# Patient Record
Sex: FEMALE | Race: WHITE | NOT HISPANIC OR LATINO | Employment: OTHER | URBAN - METROPOLITAN AREA
[De-identification: names, ages, dates, MRNs, and addresses within clinical notes are randomized per-mention and may not be internally consistent; named-entity substitution may affect disease eponyms.]

---

## 2023-07-11 ENCOUNTER — NURSING HOME VISIT (OUTPATIENT)
Dept: POST ACUTE CARE | Facility: EXTERNAL LOCATION | Age: 88
End: 2023-07-11
Payer: MEDICARE

## 2023-07-11 VITALS
WEIGHT: 127 LBS | HEIGHT: 68 IN | HEART RATE: 87 BPM | SYSTOLIC BLOOD PRESSURE: 118 MMHG | DIASTOLIC BLOOD PRESSURE: 70 MMHG | BODY MASS INDEX: 19.25 KG/M2

## 2023-07-11 DIAGNOSIS — L30.8 HERPES ZOSTER DERMATITIS: ICD-10-CM

## 2023-07-11 DIAGNOSIS — E03.9 ACQUIRED HYPOTHYROIDISM: ICD-10-CM

## 2023-07-11 DIAGNOSIS — I10 PRIMARY HYPERTENSION: ICD-10-CM

## 2023-07-11 DIAGNOSIS — R42 VERTIGO: ICD-10-CM

## 2023-07-11 DIAGNOSIS — I63.9: ICD-10-CM

## 2023-07-11 DIAGNOSIS — R41.89 COGNITIVE DECLINE: Primary | ICD-10-CM

## 2023-07-11 DIAGNOSIS — B02.8 HERPES ZOSTER DERMATITIS: ICD-10-CM

## 2023-07-11 PROBLEM — H40.1122 PRIMARY OPEN-ANGLE GLAUCOMA, LEFT EYE, MODERATE STAGE: Status: ACTIVE | Noted: 2023-07-11

## 2023-07-11 PROBLEM — R73.03 PRE-DIABETES: Status: ACTIVE | Noted: 2023-07-11

## 2023-07-11 PROBLEM — E78.5 HLD (HYPERLIPIDEMIA): Status: ACTIVE | Noted: 2023-07-11

## 2023-07-11 PROBLEM — I35.0 SEVERE AORTIC STENOSIS: Status: ACTIVE | Noted: 2023-07-11

## 2023-07-11 PROBLEM — I63.532: Status: RESOLVED | Noted: 2023-07-11 | Resolved: 2023-07-11

## 2023-07-11 PROBLEM — I63.532: Status: ACTIVE | Noted: 2023-07-11

## 2023-07-11 PROBLEM — H25.11 AGE-RELATED NUCLEAR CATARACT OF RIGHT EYE: Status: ACTIVE | Noted: 2023-07-11

## 2023-07-11 PROBLEM — H25.12 AGE-RELATED NUCLEAR CATARACT OF LEFT EYE: Status: ACTIVE | Noted: 2023-07-11

## 2023-07-11 PROCEDURE — 99348 HOME/RES VST EST LOW MDM 30: CPT | Performed by: NURSE PRACTITIONER

## 2023-07-11 NOTE — PROGRESS NOTES
"Subjective   Radha Lorenzo is a 88 y.o. female who is assisted living/ home patient being seen and evaluated for multiple medical problems.    88y old female admitted to [facility] today following a stay in SNF after being hospitalized for shingles that was extensive, now healing well, no open areas. She has PMH of Hypothyroid,AV disorder, HTN,glaucoma and frequent UTI.    50% of time was spent on preparing to see the patient, performing exam or evaluation, coordination of care, ordering medications,tests and labs, referring and communicating with other health care providers , interpreting results, obtaining and reviewing history, tests, medications and documenting clinical information  EMR. Time spent >35 minutes             Objective   /70   Pulse 87   Ht 1.727 m (5' 8\")   Wt 57.6 kg (127 lb)   BMI 19.31 kg/m²     Physical Exam  Vitals and nursing note reviewed.   Constitutional:       General: She is not in acute distress.  HENT:      Head: Normocephalic and atraumatic.   Cardiovascular:      Rate and Rhythm: Normal rate and regular rhythm.      Heart sounds: Murmur heard.   Pulmonary:      Effort: Pulmonary effort is normal.      Breath sounds: Normal breath sounds.   Abdominal:      General: Bowel sounds are normal.      Palpations: Abdomen is soft.   Musculoskeletal:      Right lower leg: No edema.      Left lower leg: No edema.   Skin:     General: Skin is warm and dry.   Neurological:      Mental Status: She is alert and oriented to person, place, and time.   Psychiatric:         Mood and Affect: Mood normal.         Assessment/Plan   Problem List Items Addressed This Visit       Cognitive decline - Primary    Primary hypertension     Patient stable, will continue same treatment and monitor. Nursing to inform CNP/MD of any changes in condition or new problems         Acquired hypothyroidism    Vertigo    Herpes zoster dermatitis     Healing          Paralytic stroke (CMS/[facility])     2/2023          " Check blood work TSH,A1C,BMP,CBC,B12, Vitamin D and Mag in a few weeks.

## 2023-07-11 NOTE — LETTER
"Patient: Radha Lorenzo  : 1934    Encounter Date: 2023    Subjective  Radha Lorenzo is a 88 y.o. female who is assisted living/ home patient being seen and evaluated for multiple medical problems.    88y old female admitted to [facility] today following a stay in SNF after being hospitalized for shingles that was extensive, now healing well, no open areas. She has PMH of Hypothyroid,AV disorder, HTN,glaucoma and frequent UTI.    50% of time was spent on preparing to see the patient, performing exam or evaluation, coordination of care, ordering medications,tests and labs, referring and communicating with other health care providers , interpreting results, obtaining and reviewing history, tests, medications and documenting clinical information  EMR. Time spent >35 minutes             Objective  /70   Pulse 87   Ht 1.727 m (5' 8\")   Wt 57.6 kg (127 lb)   BMI 19.31 kg/m²     Physical Exam  Vitals and nursing note reviewed.   Constitutional:       General: She is not in acute distress.  HENT:      Head: Normocephalic and atraumatic.   Cardiovascular:      Rate and Rhythm: Normal rate and regular rhythm.      Heart sounds: Murmur heard.   Pulmonary:      Effort: Pulmonary effort is normal.      Breath sounds: Normal breath sounds.   Abdominal:      General: Bowel sounds are normal.      Palpations: Abdomen is soft.   Musculoskeletal:      Right lower leg: No edema.      Left lower leg: No edema.   Skin:     General: Skin is warm and dry.   Neurological:      Mental Status: She is alert and oriented to person, place, and time.   Psychiatric:         Mood and Affect: Mood normal.         Assessment/Plan  Problem List Items Addressed This Visit       Cognitive decline - Primary    Primary hypertension     Patient stable, will continue same treatment and monitor. Nursing to inform CNP/MD of any changes in condition or new problems         Acquired hypothyroidism    Vertigo    Herpes zoster " dermatitis     Healing          Paralytic stroke (CMS/[facility])     2/2023          Check blood work TSH,A1C,BMP,CBC,B12, Vitamin D and Mag in a few weeks.      Electronically Signed By: FELIPA Garduno   7/11/23  5:00 PM

## 2023-07-24 ENCOUNTER — NURSING HOME VISIT (OUTPATIENT)
Dept: POST ACUTE CARE | Facility: EXTERNAL LOCATION | Age: 88
End: 2023-07-24
Payer: MEDICARE

## 2023-07-24 VITALS
BODY MASS INDEX: 16.83 KG/M2 | TEMPERATURE: 97.7 F | SYSTOLIC BLOOD PRESSURE: 96 MMHG | DIASTOLIC BLOOD PRESSURE: 69 MMHG | HEART RATE: 77 BPM | WEIGHT: 110.7 LBS

## 2023-07-24 DIAGNOSIS — E03.9 ACQUIRED HYPOTHYROIDISM: ICD-10-CM

## 2023-07-24 DIAGNOSIS — M25.552 PAIN OF LEFT HIP: ICD-10-CM

## 2023-07-24 DIAGNOSIS — M54.50 ACUTE LEFT-SIDED LOW BACK PAIN WITHOUT SCIATICA: ICD-10-CM
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
## 2023-07-24 DIAGNOSIS — I95.1 ORTHOSTATIC HYPOTENSION: ICD-10-CM

## 2023-07-24 DIAGNOSIS — I10 PRIMARY HYPERTENSION: Primary | ICD-10-CM

## 2023-07-24 PROCEDURE — 99349 HOME/RES VST EST MOD MDM 40: CPT | Performed by: NURSE PRACTITIONER

## 2023-07-24 NOTE — PROGRESS NOTES
Subjective   Radha Lorenzo is a 88 y.o. female who is assisted living/ home patient being seen and evaluated for multiple medical problems.    Resident has had two falls the past few days with complaints of dizziness when standing, she states she has learned to get up slowly. She had no apparent injuries, she is ambulating with her walker but states her LT posterior hip area hurts when she stands. Medications reviewed, she is on amlodipine 5 mg daily and BP between 96//70.   Recent blood work is good.     50% of time was spent on preparing to see the patient, performing exam or evaluation, coordination of care, ordering medications,tests and labs, referring and communicating with other health care providers , interpreting results, obtaining and reviewing history, tests, medications and documenting clinical information  EMR. Time spent >35 minutes             Objective   BP 96/69   Pulse 77   Temp 36.5 °C (97.7 °F)   Wt 50.2 kg (110 lb 11.2 oz)   BMI 16.83 kg/m²     Physical Exam  Vitals and nursing note reviewed.   Constitutional:       General: She is not in acute distress.  HENT:      Head: Normocephalic and atraumatic.   Cardiovascular:      Rate and Rhythm: Normal rate and regular rhythm.      Heart sounds: Murmur heard.   Pulmonary:      Effort: Pulmonary effort is normal.      Breath sounds: Normal breath sounds.   Musculoskeletal:      Right lower leg: No edema.      Left lower leg: No edema.   Skin:     General: Skin is warm and dry.   Neurological:      Mental Status: She is alert and oriented to person, place, and time.      Comments: forgetful   Psychiatric:         Mood and Affect: Mood normal.         Assessment/Plan   Problem List Items Addressed This Visit       Primary hypertension - Primary     Add hold parameter to amlodipine 5 mg daily for SBP<110         Acquired hypothyroidism     TSH level WNL          Other Visit Diagnoses       Orthostatic hypotension        check orthostatic BP  and HR daily x4    Pain of left hip        check HIP Xray    Acute left-sided low back pain without sciatica        check lumbar spine x ray    Fall, initial encounter        PT/OT

## 2023-07-24 NOTE — LETTER
Patient: Radha Lorenzo  : 1934    Encounter Date: 2023    Subjective  Radha Lorenzo is a 88 y.o. female who is assisted living/ home patient being seen and evaluated for multiple medical problems.    Resident has had two falls the past few days with complaints of dizziness when standing, she states she has learned to get up slowly. She had no apparent injuries, she is ambulating with her walker but states her LT posterior hip area hurts when she stands. Medications reviewed, she is on amlodipine 5 mg daily and BP between 96//70.   Recent blood work is good.     50% of time was spent on preparing to see the patient, performing exam or evaluation, coordination of care, ordering medications,tests and labs, referring and communicating with other health care providers , interpreting results, obtaining and reviewing history, tests, medications and documenting clinical information  EMR. Time spent >35 minutes             Objective  BP 96/69   Pulse 77   Temp 36.5 °C (97.7 °F)   Wt 50.2 kg (110 lb 11.2 oz)   BMI 16.83 kg/m²     Physical Exam  Vitals and nursing note reviewed.   Constitutional:       General: She is not in acute distress.  HENT:      Head: Normocephalic and atraumatic.   Cardiovascular:      Rate and Rhythm: Normal rate and regular rhythm.      Heart sounds: Murmur heard.   Pulmonary:      Effort: Pulmonary effort is normal.      Breath sounds: Normal breath sounds.   Musculoskeletal:      Right lower leg: No edema.      Left lower leg: No edema.   Skin:     General: Skin is warm and dry.   Neurological:      Mental Status: She is alert and oriented to person, place, and time.      Comments: forgetful   Psychiatric:         Mood and Affect: Mood normal.         Assessment/Plan  Problem List Items Addressed This Visit       Primary hypertension - Primary     Add hold parameter to amlodipine 5 mg daily for SBP<110         Acquired hypothyroidism     TSH level WNL          Other Visit  Diagnoses       Orthostatic hypotension        check orthostatic BP and HR daily x4    Pain of left hip        check HIP Xray    Acute left-sided low back pain without sciatica        check lumbar spine x ray    Fall, initial encounter        PT/OT              Electronically Signed By: FELIPA Garduno   7/24/23  2:01 PM

## 2023-10-17 ENCOUNTER — NURSING HOME VISIT (OUTPATIENT)
Dept: POST ACUTE CARE | Facility: EXTERNAL LOCATION | Age: 88
End: 2023-10-17
Payer: MEDICARE

## 2023-10-17 VITALS
WEIGHT: 136 LBS | BODY MASS INDEX: 20.68 KG/M2 | TEMPERATURE: 98.2 F | DIASTOLIC BLOOD PRESSURE: 73 MMHG | SYSTOLIC BLOOD PRESSURE: 127 MMHG | HEART RATE: 74 BPM

## 2023-10-17 DIAGNOSIS — R42 VERTIGO: Primary | ICD-10-CM

## 2023-10-17 DIAGNOSIS — I10 PRIMARY HYPERTENSION: ICD-10-CM

## 2023-10-17 DIAGNOSIS — E03.9 ACQUIRED HYPOTHYROIDISM: ICD-10-CM
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
## 2023-10-17 PROCEDURE — 99349 HOME/RES VST EST MOD MDM 40: CPT | Performed by: NURSE PRACTITIONER

## 2023-10-17 RX ORDER — AMLODIPINE BESYLATE 5 MG/1
5 TABLET ORAL DAILY
COMMUNITY
Start: 2022-03-29

## 2023-10-17 ASSESSMENT — ENCOUNTER SYMPTOMS
DIARRHEA: 0
VOMITING: 0
AGITATION: 0
FATIGUE: 0
SHORTNESS OF BREATH: 0
SLEEP DISTURBANCE: 0
WEAKNESS: 0
NAUSEA: 0
FEVER: 0
CONSTIPATION: 0
NERVOUS/ANXIOUS: 0
COUGH: 0

## 2023-10-17 NOTE — PROGRESS NOTES
Subjective   Radha Lorenzo is a 88 y.o. female who is assisted living/ home patient being seen and evaluated for multiple medical problems.    Seen today S/P fall, she states she lost her balance in the bathroom, she hit the RT side of her face, now with large bruise and cut above her eye. Denies any LOC, not on any blood thinners.     50% of time was spent on preparing to see the patient, performing exam or evaluation, coordination of care, ordering medications,tests and labs, referring and communicating with other health care providers , interpreting results, obtaining and reviewing history, tests, medications and documenting clinical information  EMR. Time spent >35 minutes         Review of Systems   Constitutional:  Negative for fatigue and fever.   Respiratory:  Negative for cough and shortness of breath.    Cardiovascular:  Negative for chest pain and leg swelling.   Gastrointestinal:  Negative for constipation, diarrhea, nausea and vomiting.   Skin:  Negative for pallor and rash.        Bruise to RT side of face and laceration above RT eye   Neurological:  Negative for weakness.   Psychiatric/Behavioral:  Negative for agitation, behavioral problems and sleep disturbance. The patient is not nervous/anxious.        Objective   /73   Pulse 74   Temp 36.8 °C (98.2 °F)   Wt 61.7 kg (136 lb)   BMI 20.68 kg/m²     Physical Exam  Vitals and nursing note reviewed.   Constitutional:       General: She is not in acute distress.  HENT:      Head: Normocephalic and atraumatic.   Cardiovascular:      Rate and Rhythm: Normal rate and regular rhythm.      Heart sounds: Murmur heard.   Pulmonary:      Effort: Pulmonary effort is normal.      Breath sounds: Normal breath sounds.   Musculoskeletal:      Right lower leg: No edema.      Left lower leg: No edema.   Skin:     General: Skin is warm and dry.   Neurological:      Mental Status: She is alert and oriented to person, place, and time.      Comments: forgetful    Psychiatric:         Mood and Affect: Mood normal.         Assessment/Plan   Problem List Items Addressed This Visit       Primary hypertension    Acquired hypothyroidism    Vertigo - Primary     Other Visit Diagnoses       Fall, initial encounter        Check ortho static BP BID x 3 days  CBC,BMP,TSH on 10/20

## 2023-10-17 NOTE — LETTER
Patient: Radha Lorenzo  : 1934    Encounter Date: 10/17/2023    Subjective  Radha Lorenzo is a 88 y.o. female who is assisted living/ home patient being seen and evaluated for multiple medical problems.    Seen today S/P fall, she states she lost her balance in the bathroom, she hit the RT side of her face, now with large bruise and cut above her eye. Denies any LOC, not on any blood thinners.     50% of time was spent on preparing to see the patient, performing exam or evaluation, coordination of care, ordering medications,tests and labs, referring and communicating with other health care providers , interpreting results, obtaining and reviewing history, tests, medications and documenting clinical information  EMR. Time spent >35 minutes         Review of Systems   Constitutional:  Negative for fatigue and fever.   Respiratory:  Negative for cough and shortness of breath.    Cardiovascular:  Negative for chest pain and leg swelling.   Gastrointestinal:  Negative for constipation, diarrhea, nausea and vomiting.   Skin:  Negative for pallor and rash.        Bruise to RT side of face and laceration above RT eye   Neurological:  Negative for weakness.   Psychiatric/Behavioral:  Negative for agitation, behavioral problems and sleep disturbance. The patient is not nervous/anxious.        Objective  /73   Pulse 74   Temp 36.8 °C (98.2 °F)   Wt 61.7 kg (136 lb)   BMI 20.68 kg/m²     Physical Exam  Vitals and nursing note reviewed.   Constitutional:       General: She is not in acute distress.  HENT:      Head: Normocephalic and atraumatic.   Cardiovascular:      Rate and Rhythm: Normal rate and regular rhythm.      Heart sounds: Murmur heard.   Pulmonary:      Effort: Pulmonary effort is normal.      Breath sounds: Normal breath sounds.   Musculoskeletal:      Right lower leg: No edema.      Left lower leg: No edema.   Skin:     General: Skin is warm and dry.   Neurological:      Mental Status: She is  alert and oriented to person, place, and time.      Comments: forgetful   Psychiatric:         Mood and Affect: Mood normal.         Assessment/Plan  Problem List Items Addressed This Visit       Primary hypertension    Acquired hypothyroidism    Vertigo - Primary     Other Visit Diagnoses       Fall, initial encounter        Check ortho static BP BID x 3 days  CBC,BMP,TSH on 10/20              Electronically Signed By: FELIPA Garduno   10/17/23  2:38 PM

## 2023-10-26 ENCOUNTER — NURSING HOME VISIT (OUTPATIENT)
Dept: POST ACUTE CARE | Facility: EXTERNAL LOCATION | Age: 88
End: 2023-10-26
Payer: MEDICARE

## 2023-10-26 DIAGNOSIS — H61.23 EXCESSIVE CERUMEN IN BOTH EAR CANALS: Primary | ICD-10-CM

## 2023-10-26 DIAGNOSIS — I10 PRIMARY HYPERTENSION: ICD-10-CM

## 2023-10-26 PROCEDURE — 99348 HOME/RES VST EST LOW MDM 30: CPT | Performed by: NURSE PRACTITIONER

## 2023-10-26 NOTE — LETTER
Patient: Radha Lorenzo  : 1934    Encounter Date: 10/26/2023    Subjective  Radha Lorenzo is a 88 y.o. female who is assisted living/ home patient being seen and evaluated for multiple medical problems.    Her orthostatic BP and HR were okay, she today is having complaints of increased wax in her ears         Review of Systems   HENT:  Negative for congestion, ear discharge and ear pain.         Increased wax and decreased hearing       Objective  There were no vitals taken for this visit.    Physical Exam  Vitals and nursing note reviewed.   Constitutional:       General: She is not in acute distress.  HENT:      Head: Normocephalic and atraumatic.      Right Ear: There is impacted cerumen.      Left Ear: There is impacted cerumen.   Cardiovascular:      Rate and Rhythm: Normal rate and regular rhythm.      Heart sounds: Murmur heard.   Pulmonary:      Effort: Pulmonary effort is normal.      Breath sounds: Normal breath sounds.   Musculoskeletal:      Right lower leg: No edema.      Left lower leg: No edema.   Skin:     General: Skin is warm and dry.   Neurological:      Mental Status: She is alert and oriented to person, place, and time.   Psychiatric:         Mood and Affect: Mood normal.         Assessment/Plan  Problem List Items Addressed This Visit       Primary hypertension     Controlled  Continue same treatment  Amlodipine 5mg daily         Excessive cerumen in both ear canals - Primary     Debrox gtts to bilateral ears BID x 4 days then flush  Will reevaluate next week 10/31              Electronically Signed By: FERN Garduno-CNP   10/26/23 12:58 PM

## 2023-10-26 NOTE — PROGRESS NOTES
Subjective   Radha Lorenzo is a 88 y.o. female who is assisted living/ home patient being seen and evaluated for multiple medical problems.    Her orthostatic BP and HR were okay, she today is having complaints of increased wax in her ears         Review of Systems   HENT:  Negative for congestion, ear discharge and ear pain.         Increased wax and decreased hearing       Objective   There were no vitals taken for this visit.    Physical Exam  Vitals and nursing note reviewed.   Constitutional:       General: She is not in acute distress.  HENT:      Head: Normocephalic and atraumatic.      Right Ear: There is impacted cerumen.      Left Ear: There is impacted cerumen.   Cardiovascular:      Rate and Rhythm: Normal rate and regular rhythm.      Heart sounds: Murmur heard.   Pulmonary:      Effort: Pulmonary effort is normal.      Breath sounds: Normal breath sounds.   Musculoskeletal:      Right lower leg: No edema.      Left lower leg: No edema.   Skin:     General: Skin is warm and dry.   Neurological:      Mental Status: She is alert and oriented to person, place, and time.   Psychiatric:         Mood and Affect: Mood normal.         Assessment/Plan   Problem List Items Addressed This Visit       Primary hypertension     Controlled  Continue same treatment  Amlodipine 5mg daily         Excessive cerumen in both ear canals - Primary     Debrox gtts to bilateral ears BID x 4 days then flush  Will reevaluate next week 10/31

## 2023-11-07 ENCOUNTER — NURSING HOME VISIT (OUTPATIENT)
Dept: POST ACUTE CARE | Facility: EXTERNAL LOCATION | Age: 88
End: 2023-11-07
Payer: MEDICARE

## 2023-11-07 DIAGNOSIS — H61.23 EXCESSIVE CERUMEN IN BOTH EAR CANALS: Primary | ICD-10-CM

## 2023-11-07 PROCEDURE — 99348 HOME/RES VST EST LOW MDM 30: CPT | Performed by: NURSE PRACTITIONER

## 2023-11-07 ASSESSMENT — ENCOUNTER SYMPTOMS
HEADACHES: 0
DIZZINESS: 0

## 2023-11-07 NOTE — PROGRESS NOTES
Subjective   Radha Lorenzo is a 88 y.o. female who is assisted living/ home patient being seen and evaluated for multiple medical problems.    Seen today for follow up on excess cerumen. Upon exam her ears are clearer, still some cerumen left but too deep to remove at this time. She has an appointment with otology for removal. In the meantime will continue debrox gtts x 4 more days. She denies pain or discomfort         Review of Systems   HENT:  Positive for hearing loss. Negative for ear discharge and ear pain.    Neurological:  Negative for dizziness and headaches.       Objective       Physical Exam  Vitals and nursing note reviewed.   Constitutional:       General: She is not in acute distress.  HENT:      Head: Normocephalic and atraumatic.      Right Ear: Ear canal and external ear normal.      Left Ear: Ear canal and external ear normal.      Ears:      Comments: [place]    Cardiovascular:      Rate and Rhythm: Normal rate and regular rhythm.      Heart sounds: Murmur heard.   Pulmonary:      Effort: Pulmonary effort is normal.      Breath sounds: Normal breath sounds.   Musculoskeletal:      Right lower leg: No edema.      Left lower leg: No edema.   Skin:     General: Skin is warm and dry.   Neurological:      Mental Status: She is alert and oriented to person, place, and time.   Psychiatric:         Mood and Affect: Mood normal.         Assessment/Plan   Problem List Items Addressed This Visit       Excessive cerumen in both ear canals - Primary     Continue debrox gtts to each ear BID x 4 more days

## 2023-11-07 NOTE — LETTER
Patient: Radha Lorenzo  : 1934    Encounter Date: 2023    Subjective  Radha Lorenzo is a 88 y.o. female who is assisted living/ home patient being seen and evaluated for multiple medical problems.    Seen today for follow up on excess cerumen. Upon exam her ears are clearer, still some cerumen left but too deep to remove at this time. She has an appointment with otology for removal. In the meantime will continue debrox gtts x 4 more days. She denies pain or discomfort         Review of Systems   HENT:  Positive for hearing loss. Negative for ear discharge and ear pain.    Neurological:  Negative for dizziness and headaches.       Objective      Physical Exam  Vitals and nursing note reviewed.   Constitutional:       General: She is not in acute distress.  HENT:      Head: Normocephalic and atraumatic.      Right Ear: Ear canal and external ear normal.      Left Ear: Ear canal and external ear normal.      Ears:      Comments: [place]    Cardiovascular:      Rate and Rhythm: Normal rate and regular rhythm.      Heart sounds: Murmur heard.   Pulmonary:      Effort: Pulmonary effort is normal.      Breath sounds: Normal breath sounds.   Musculoskeletal:      Right lower leg: No edema.      Left lower leg: No edema.   Skin:     General: Skin is warm and dry.   Neurological:      Mental Status: She is alert and oriented to person, place, and time.   Psychiatric:         Mood and Affect: Mood normal.         Assessment/Plan  Problem List Items Addressed This Visit       Excessive cerumen in both ear canals - Primary     Continue debrox gtts to each ear BID x 4 more days              Electronically Signed By: FERN Garduno-CNP   23  1:24 PM

## 2023-12-04 ENCOUNTER — NURSING HOME VISIT (OUTPATIENT)
Dept: POST ACUTE CARE | Facility: EXTERNAL LOCATION | Age: 88
End: 2023-12-04
Payer: MEDICARE

## 2023-12-04 DIAGNOSIS — I95.1 ORTHOSTATIC HYPOTENSION: ICD-10-CM

## 2023-12-04 DIAGNOSIS — I35.0 SEVERE AORTIC STENOSIS: Primary | ICD-10-CM

## 2023-12-04 PROCEDURE — 99349 HOME/RES VST EST MOD MDM 40: CPT | Performed by: INTERNAL MEDICINE

## 2023-12-04 NOTE — PROGRESS NOTES
Follow-up visit  Patient request to be seen because of complaints of postural dizziness.  She reports that she gets lightheaded or dizzy upon standing up from a supine position or sitting position.  Postural blood pressures were obtained and demonstrated sitting blood pressure 138/61 with a heart rate of 75 and upon standing her blood pressure dropped to 108/52 with heart rate of 93.    Medications and orders reviewed.    Review of systems  No other significant plaints were reported    Physical exam  Orthostatic blood pressures as recorded above.  Pulse ox is 97% on room air, temp is 97.6  She is alert oriented x3 no apparent distress.  Lungs are clear.  Heart rate and rhythm is regular with a 4/6 systolic ejection murmur consistent with her history of aortic stenosis.    Assessment and care plan  Evidence of symptomatic orthostatic hypotension along with history of significant aortic stenosis.  Patient is currently on amlodipine 5 mg twice a day but given the above clinical situation we will decrease the amlodipine to 5 mg once a day but add metoprolol succinate 25 mg once daily to help optimize heart rate given the aortic stenosis.  We will monitor the situation and adjust the above meds accordingly.  If necessary we can add low-dose midodrine if the symptoms persist

## 2023-12-04 NOTE — LETTER
Patient: Radha Lorenzo  : 1934    Encounter Date: 2023    Follow-up visit  Patient request to be seen because of complaints of postural dizziness.  She reports that she gets lightheaded or dizzy upon standing up from a supine position or sitting position.  Postural blood pressures were obtained and demonstrated sitting blood pressure 138/61 with a heart rate of 75 and upon standing her blood pressure dropped to 108/52 with heart rate of 93.    Medications and orders reviewed.    Review of systems  No other significant plaints were reported    Physical exam  Orthostatic blood pressures as recorded above.  Pulse ox is 97% on room air, temp is 97.6  She is alert oriented x3 no apparent distress.  Lungs are clear.  Heart rate and rhythm is regular with a 4/6 systolic ejection murmur consistent with her history of aortic stenosis.    Assessment and care plan  Evidence of symptomatic orthostatic hypotension along with history of significant aortic stenosis.  Patient is currently on amlodipine 5 mg twice a day but given the above clinical situation we will decrease the amlodipine to 5 mg once a day but add metoprolol succinate 25 mg once daily to help optimize heart rate given the aortic stenosis.  We will monitor the situation and adjust the above meds accordingly.  If necessary we can add low-dose midodrine if the symptoms persist      Electronically Signed By: Tej Olvera DO   23  1:20 PM

## 2023-12-07 ENCOUNTER — NURSING HOME VISIT (OUTPATIENT)
Dept: POST ACUTE CARE | Facility: EXTERNAL LOCATION | Age: 88
End: 2023-12-07
Payer: MEDICARE

## 2023-12-07 VITALS
DIASTOLIC BLOOD PRESSURE: 61 MMHG | WEIGHT: 134 LBS | BODY MASS INDEX: 20.31 KG/M2 | HEART RATE: 76 BPM | SYSTOLIC BLOOD PRESSURE: 138 MMHG | TEMPERATURE: 97.6 F | HEIGHT: 68 IN

## 2023-12-07 DIAGNOSIS — I10 PRIMARY HYPERTENSION: Primary | ICD-10-CM

## 2023-12-07 DIAGNOSIS — H61.23 EXCESSIVE CERUMEN IN BOTH EAR CANALS: ICD-10-CM

## 2023-12-07 DIAGNOSIS — R42 VERTIGO: ICD-10-CM

## 2023-12-07 PROCEDURE — 99348 HOME/RES VST EST LOW MDM 30: CPT | Performed by: NURSE PRACTITIONER

## 2023-12-07 ASSESSMENT — ENCOUNTER SYMPTOMS
DIZZINESS: 1
HEADACHES: 0
FACIAL ASYMMETRY: 0

## 2023-12-07 NOTE — LETTER
"Patient: Radha Lorenzo  : 1934    Encounter Date: 2023    Subjective  Radha Lorenzo is a 89 y.o. female who is assisted living/ home patient being seen and evaluated for multiple medical problems.    She was having complaints of dizziness, her BP medications were decreased on  and BP has been 121//61, no hypotension. She was asking if the DR would be back to see her and what he was going to do, I explained what he did and reassured her that he would be back. She still feels dizzy once in a while         Review of Systems   Neurological:  Positive for dizziness. Negative for syncope, facial asymmetry and headaches.       Objective  /61   Pulse 76   Temp 36.4 °C (97.6 °F)   Ht 1.715 m (5' 7.5\")   Wt 60.8 kg (134 lb)   BMI 20.68 kg/m²     Physical Exam  Vitals and nursing note reviewed.   Constitutional:       General: She is not in acute distress.  HENT:      Head: Normocephalic and atraumatic.      Ears:      Comments: [place]    Cardiovascular:      Rate and Rhythm: Normal rate and regular rhythm.      Heart sounds: Murmur heard.   Pulmonary:      Effort: Pulmonary effort is normal.      Breath sounds: Normal breath sounds.   Musculoskeletal:      Right lower leg: No edema.      Left lower leg: No edema.   Skin:     General: Skin is warm and dry.   Neurological:      Mental Status: She is alert and oriented to person, place, and time.   Psychiatric:         Mood and Affect: Mood normal.         Assessment/Plan  Problem List Items Addressed This Visit       Primary hypertension - Primary     Controlled  No hypotension recorded since  in [facility]  On amlodipine 5mg and metoprolol 25         Vertigo     PMH of vertigo         Excessive cerumen in both ear canals     Needs follow up with ENT            Electronically Signed By: FERN Garduno-CNP   23  1:36 PM  "

## 2023-12-07 NOTE — PROGRESS NOTES
"Subjective   Radha Lorenzo is a 89 y.o. female who is assisted living/ home patient being seen and evaluated for multiple medical problems.    She was having complaints of dizziness, her BP medications were decreased on 12/4 and BP has been 121//61, no hypotension. She was asking if the DR would be back to see her and what he was going to do, I explained what he did and reassured her that he would be back. She still feels dizzy once in a while         Review of Systems   Neurological:  Positive for dizziness. Negative for syncope, facial asymmetry and headaches.       Objective   /61   Pulse 76   Temp 36.4 °C (97.6 °F)   Ht 1.715 m (5' 7.5\")   Wt 60.8 kg (134 lb)   BMI 20.68 kg/m²     Physical Exam  Vitals and nursing note reviewed.   Constitutional:       General: She is not in acute distress.  HENT:      Head: Normocephalic and atraumatic.      Ears:      Comments: [place]    Cardiovascular:      Rate and Rhythm: Normal rate and regular rhythm.      Heart sounds: Murmur heard.   Pulmonary:      Effort: Pulmonary effort is normal.      Breath sounds: Normal breath sounds.   Musculoskeletal:      Right lower leg: No edema.      Left lower leg: No edema.   Skin:     General: Skin is warm and dry.   Neurological:      Mental Status: She is alert and oriented to person, place, and time.   Psychiatric:         Mood and Affect: Mood normal.         Assessment/Plan   Problem List Items Addressed This Visit       Primary hypertension - Primary     Controlled  No hypotension recorded since 12/4 in [facility]  On amlodipine 5mg and metoprolol 25         Vertigo     PMH of vertigo         Excessive cerumen in both ear canals     Needs follow up with ENT            "

## 2023-12-19 ENCOUNTER — NURSING HOME VISIT (OUTPATIENT)
Dept: POST ACUTE CARE | Facility: EXTERNAL LOCATION | Age: 88
End: 2023-12-19
Payer: MEDICARE

## 2023-12-19 VITALS — TEMPERATURE: 97.6 F | DIASTOLIC BLOOD PRESSURE: 69 MMHG | HEART RATE: 70 BPM | SYSTOLIC BLOOD PRESSURE: 128 MMHG

## 2023-12-19 DIAGNOSIS — R42 VERTIGO: Primary | ICD-10-CM

## 2023-12-19 DIAGNOSIS — R41.89 COGNITIVE DECLINE: ICD-10-CM

## 2023-12-19 DIAGNOSIS — I10 PRIMARY HYPERTENSION: ICD-10-CM

## 2023-12-19 DIAGNOSIS — H61.23 EXCESSIVE CERUMEN IN BOTH EAR CANALS: ICD-10-CM

## 2023-12-19 PROCEDURE — 99348 HOME/RES VST EST LOW MDM 30: CPT | Performed by: NURSE PRACTITIONER

## 2023-12-19 ASSESSMENT — ENCOUNTER SYMPTOMS: DIZZINESS: 1

## 2023-12-19 NOTE — LETTER
Patient: Radha Lorenzo  : 1934    Encounter Date: 2023    Subjective  Radha Lorenzo is a 89 y.o. female who is assisted living/ home patient being seen and evaluated for multiple medical problems.    Asked to see resident, who told staff that we should know what its about. Upon visit she does not remember saying that and is not sure what she wanted to see the MD or CNP for. She states she still feels dizzy after Dr changed her medications. She does have a history of vertigo, BP is stable, no reports of ortho static hypotension. She also needs her ears cleaned out by ENT.          Review of Systems   Neurological:  Positive for dizziness.       Objective  /69   Pulse 70   Temp 36.4 °C (97.6 °F)     Physical Exam  Vitals and nursing note reviewed.   Constitutional:       General: She is not in acute distress.  HENT:      Head: Normocephalic and atraumatic.      Ears:      Comments: [place]    Cardiovascular:      Rate and Rhythm: Normal rate and regular rhythm.      Heart sounds: Murmur heard.   Musculoskeletal:      Right lower leg: No edema.      Left lower leg: No edema.   Skin:     General: Skin is warm and dry.   Neurological:      Mental Status: She is alert. Mental status is at baseline.      Comments: forgetful   Psychiatric:         Mood and Affect: Mood normal.         Assessment/Plan  Problem List Items Addressed This Visit       Cognitive decline    Primary hypertension     stable         Vertigo - Primary     Start meclizine 25mg po daily PRN  She is aware she must ask for it         Excessive cerumen in both ear canals     Needs follow up with ENT  She states she has an appointment            Electronically Signed By: FELIPA Garduno   23 12:44 PM

## 2023-12-19 NOTE — PROGRESS NOTES
Subjective   Radha Lorenzo is a 89 y.o. female who is assisted living/ home patient being seen and evaluated for multiple medical problems.    Asked to see resident, who told staff that we should know what its about. Upon visit she does not remember saying that and is not sure what she wanted to see the MD or CNP for. She states she still feels dizzy after Dr changed her medications. She does have a history of vertigo, BP is stable, no reports of ortho static hypotension. She also needs her ears cleaned out by ENT.          Review of Systems   Neurological:  Positive for dizziness.       Objective   /69   Pulse 70   Temp 36.4 °C (97.6 °F)     Physical Exam  Vitals and nursing note reviewed.   Constitutional:       General: She is not in acute distress.  HENT:      Head: Normocephalic and atraumatic.      Ears:      Comments: [place]    Cardiovascular:      Rate and Rhythm: Normal rate and regular rhythm.      Heart sounds: Murmur heard.   Musculoskeletal:      Right lower leg: No edema.      Left lower leg: No edema.   Skin:     General: Skin is warm and dry.   Neurological:      Mental Status: She is alert. Mental status is at baseline.      Comments: forgetful   Psychiatric:         Mood and Affect: Mood normal.         Assessment/Plan   Problem List Items Addressed This Visit       Cognitive decline    Primary hypertension     stable         Vertigo - Primary     Start meclizine 25mg po daily PRN  She is aware she must ask for it         Excessive cerumen in both ear canals     Needs follow up with ENT  She states she has an appointment

## 2024-01-09 ENCOUNTER — NURSING HOME VISIT (OUTPATIENT)
Dept: POST ACUTE CARE | Facility: EXTERNAL LOCATION | Age: 89
End: 2024-01-09
Payer: MEDICARE

## 2024-01-09 DIAGNOSIS — I10 PRIMARY HYPERTENSION: Primary | ICD-10-CM

## 2024-01-09 DIAGNOSIS — I95.1 ORTHOSTATIC HYPOTENSION: ICD-10-CM

## 2024-01-09 PROCEDURE — 99348 HOME/RES VST EST LOW MDM 30: CPT | Performed by: INTERNAL MEDICINE

## 2024-01-09 NOTE — LETTER
Patient: Radha Lorenzo  : 1934    Encounter Date: 2024    Follow-up visit  Patient still complains of dizziness at times.  She was started empirically on meclizine but this does not appear to improve her symptoms.  She reports that when she stands or gets out of bed she feels weak almost fainted in description but not a sense of off-balance or spinning.    Medications and orders were reviewed.    Physical exam  Blood pressure is 142/80 and blood pressure trends tend to be in the high normal to slightly elevated range at times with some evidence of lability.  Temp is 97 sevenths, pulse 66, pulse ox is 98% on room air  She is alert oriented x 3 no apparent distress.    Assessment and care plan  Recurrent symptoms of dizziness which appear to be more orthostatic in nature.  We will discontinue meclizine.  We will continue her current blood pressure medications for the time being and asked for supine and standing blood pressures every  and  with both to be recorded.  We will also empirically start midodrine 5 mg 3 times a day and monitor progress.  We will check her in the next couple of weeks      Electronically Signed By: Tej Olvera DO   24  1:12 PM   Pt is post op day 1 tonsillectomy and adenoidectomy. Per pt mother pt is doing well post op , pt drinking fluids and taking post op medications as prescribed.  Advised pt mother that pt is to push fluids, at least 1 quart a day, soft foods, and pain can wor

## 2024-01-09 NOTE — PROGRESS NOTES
Follow-up visit  Patient still complains of dizziness at times.  She was started empirically on meclizine but this does not appear to improve her symptoms.  She reports that when she stands or gets out of bed she feels weak almost fainted in description but not a sense of off-balance or spinning.    Medications and orders were reviewed.    Physical exam  Blood pressure is 142/80 and blood pressure trends tend to be in the high normal to slightly elevated range at times with some evidence of lability.  Temp is 97 sevenths, pulse 66, pulse ox is 98% on room air  She is alert oriented x 3 no apparent distress.    Assessment and care plan  Recurrent symptoms of dizziness which appear to be more orthostatic in nature.  We will discontinue meclizine.  We will continue her current blood pressure medications for the time being and asked for supine and standing blood pressures every Monday Wednesdays and Fridays with both to be recorded.  We will also empirically start midodrine 5 mg 3 times a day and monitor progress.  We will check her in the next couple of weeks

## 2024-02-06 ENCOUNTER — NURSING HOME VISIT (OUTPATIENT)
Dept: POST ACUTE CARE | Facility: EXTERNAL LOCATION | Age: 89
End: 2024-02-06
Payer: MEDICARE

## 2024-02-06 DIAGNOSIS — I10 PRIMARY HYPERTENSION: ICD-10-CM

## 2024-02-06 DIAGNOSIS — I95.1 ORTHOSTATIC HYPOTENSION: Primary | ICD-10-CM

## 2024-02-06 PROCEDURE — 99348 HOME/RES VST EST LOW MDM 30: CPT | Performed by: INTERNAL MEDICINE

## 2024-02-06 NOTE — PROGRESS NOTES
Follow-up visit  Patient seen today at her request.  She is adamant about discontinuing the midodrine.  She states that she has side effects from this which appear to be more related to dizziness.  We explained extensively to her and her son that it is likely due to the fact that the midodrine dose needs to go higher.  That her symptoms are more related orthostatic hypotension which overall has been somewhat improved on the midodrine with less complaints of dizziness and falls but in spite of this the patient is adamant about discontinuing  I had a lengthy discussion with both the patient's son and her daughter who agree with me but admits that the mother has right to make her own decisions.  We explained the increased risk of falls and dizziness as well as confusion and delirium from significant orthostatic hypotension.    Medications and orders were reviewed.  The consensus was that we would discontinue the midodrine given patient's insistence.    Physical exam  Blood pressure is 155/93 sitting 93/52 standing.  The patient is currently ambulating with a walker and continues to be insistent that she wants the medication stopped and that she does not like the way it makes her feel.  She is otherwise stable.    Assessment and care plan  Patient has hypertension with underlying orthostatic hypotension.  We believe her symptoms are due to the latter.  After lengthy discussion with the family and the patient it is agreed that we will discontinue the midodrine in spite of her questions.  We will monitor her and if she should have further problems she said she might consider some other medication.  If this proves to be the case we will try either Florinef or a once a day alpha agent    Addendum  After the above was decided the family discussed with the patient and they are willing to try once a day medication.  We will start Florinef 0.1 mg daily and monitor progress

## 2024-02-06 NOTE — LETTER
Patient: Radha Lorenzo  : 1934    Encounter Date: 2024    Follow-up visit  Patient seen today at her request.  She is adamant about discontinuing the midodrine.  She states that she has side effects from this which appear to be more related to dizziness.  We explained extensively to her and her son that it is likely due to the fact that the midodrine dose needs to go higher.  That her symptoms are more related orthostatic hypotension which overall has been somewhat improved on the midodrine with less complaints of dizziness and falls but in spite of this the patient is adamant about discontinuing  I had a lengthy discussion with both the patient's son and her daughter who agree with me but admits that the mother has right to make her own decisions.  We explained the increased risk of falls and dizziness as well as confusion and delirium from significant orthostatic hypotension.    Medications and orders were reviewed.  The consensus was that we would discontinue the midodrine given patient's insistence.    Physical exam  Blood pressure is 155/93 sitting 93/52 standing.  The patient is currently ambulating with a walker and continues to be insistent that she wants the medication stopped and that she does not like the way it makes her feel.  She is otherwise stable.    Assessment and care plan  Patient has hypertension with underlying orthostatic hypotension.  We believe her symptoms are due to the latter.  After lengthy discussion with the family and the patient it is agreed that we will discontinue the midodrine in spite of her questions.  We will monitor her and if she should have further problems she said she might consider some other medication.  If this proves to be the case we will try either Florinef or a once a day alpha agent      Electronically Signed By: Tej Olvera DO   24 11:26 AM

## 2024-03-08 ENCOUNTER — NURSING HOME VISIT (OUTPATIENT)
Dept: POST ACUTE CARE | Facility: EXTERNAL LOCATION | Age: 89
End: 2024-03-08
Payer: MEDICARE

## 2024-03-08 VITALS
DIASTOLIC BLOOD PRESSURE: 74 MMHG | RESPIRATION RATE: 18 BRPM | WEIGHT: 137 LBS | BODY MASS INDEX: 21.14 KG/M2 | HEART RATE: 72 BPM | SYSTOLIC BLOOD PRESSURE: 146 MMHG | TEMPERATURE: 97.7 F | OXYGEN SATURATION: 97 %

## 2024-03-08 DIAGNOSIS — E03.9 ACQUIRED HYPOTHYROIDISM: ICD-10-CM

## 2024-03-08 DIAGNOSIS — I95.1 ORTHOSTATIC HYPOTENSION: ICD-10-CM

## 2024-03-08 DIAGNOSIS — I35.0 SEVERE AORTIC STENOSIS: Primary | ICD-10-CM

## 2024-03-08 PROCEDURE — 99348 HOME/RES VST EST LOW MDM 30: CPT | Performed by: NURSE PRACTITIONER

## 2024-03-08 ASSESSMENT — ENCOUNTER SYMPTOMS
DIARRHEA: 0
NAUSEA: 0
COUGH: 0
DIFFICULTY URINATING: 0
PALPITATIONS: 0
CONSTIPATION: 0
VOMITING: 0
SHORTNESS OF BREATH: 0
ABDOMINAL PAIN: 0

## 2024-03-08 NOTE — ASSESSMENT & PLAN NOTE
As above, at times gets dizzy with postiion changes.  Discussed safety measures.  Also on florinef to help decrease sx.

## 2024-03-08 NOTE — PROGRESS NOTES
Subjective   Radha Lorenzo is a 89 y.o. female requested to be evaluated due to blood pressures.   HPI She was concerned because she had a few elevated BP readings, no chest pain or sob.  She also has a hx of aortic stenosis.  BP reviewed, labile but overall acceptable.  She is slightly forgetful.   There are no family members present during time of visit.    she  denies any complaints of pain  when asked.  Eating and drinking well.   No concerns per staff.       Review of Systems   Respiratory:  Negative for cough and shortness of breath.    Cardiovascular:  Negative for chest pain and palpitations.   Gastrointestinal:  Negative for abdominal pain, constipation, diarrhea, nausea and vomiting.   Genitourinary:  Negative for difficulty urinating.       Objective   /74   Pulse 72   Temp 36.5 °C (97.7 °F)   Resp 18   Wt 62.1 kg (137 lb)   SpO2 97%   BMI 21.14 kg/m²     Physical Exam  Vitals and nursing note reviewed.   Constitutional:       General: She is not in acute distress.  HENT:      Head: Normocephalic and atraumatic.      Ears:      Comments: [place]    Cardiovascular:      Rate and Rhythm: Normal rate and regular rhythm.      Heart sounds: Murmur heard.   Musculoskeletal:      Right lower leg: No edema.      Left lower leg: No edema.   Skin:     General: Skin is warm and dry.   Neurological:      Mental Status: She is alert. Mental status is at baseline.      Comments: forgetful   Psychiatric:         Mood and Affect: Mood normal.         Assessment/Plan   Problem List Items Addressed This Visit       Acquired hypothyroidism     monitor with routine labs.           Severe aortic stenosis - Primary     Murmur auscultated.  She at times gets dizzy with standing, vertigo vs orthostatic hypotension vs as.   Discussed safety measures such as slow position changes to decrease risk for falls.          Orthostatic hypotension     As above, at times gets dizzy with postiion changes.  Discussed safety  measures.  Also on florinef to help decrease sx.         BP reviewed, acceptable overall.  110-160's,.  C  Continue to monitor and adjust meds based on clinical course.

## 2024-03-08 NOTE — LETTER
Patient: Radha Lorenzo  : 1934    Encounter Date: 2024    Subjective  Radha Lorenzo is a 89 y.o. female requested to be evaluated due to blood pressures.   HPI She was concerned because she had a few elevated BP readings, no chest pain or sob.  She also has a hx of aortic stenosis.  BP reviewed, labile but overall acceptable.  She is slightly forgetful.   There are no family members present during time of visit.    she  denies any complaints of pain  when asked.  Eating and drinking well.   No concerns per staff.       Review of Systems   Respiratory:  Negative for cough and shortness of breath.    Cardiovascular:  Negative for chest pain and palpitations.   Gastrointestinal:  Negative for abdominal pain, constipation, diarrhea, nausea and vomiting.   Genitourinary:  Negative for difficulty urinating.       Objective  /74   Pulse 72   Temp 36.5 °C (97.7 °F)   Resp 18   Wt 62.1 kg (137 lb)   SpO2 97%   BMI 21.14 kg/m²     Physical Exam  Vitals and nursing note reviewed.   Constitutional:       General: She is not in acute distress.  HENT:      Head: Normocephalic and atraumatic.      Ears:      Comments: [place]    Cardiovascular:      Rate and Rhythm: Normal rate and regular rhythm.      Heart sounds: Murmur heard.   Musculoskeletal:      Right lower leg: No edema.      Left lower leg: No edema.   Skin:     General: Skin is warm and dry.   Neurological:      Mental Status: She is alert. Mental status is at baseline.      Comments: forgetful   Psychiatric:         Mood and Affect: Mood normal.         Assessment/Plan  Problem List Items Addressed This Visit       Acquired hypothyroidism     monitor with routine labs.           Severe aortic stenosis - Primary     Murmur auscultated.  She at times gets dizzy with standing, vertigo vs orthostatic hypotension vs as.   Discussed safety measures such as slow position changes to decrease risk for falls.          Orthostatic hypotension     As  above, at times gets dizzy with postiion changes.  Discussed safety measures.  Also on florinef to help decrease sx.         BP reviewed, acceptable overall.  110-160's,.  C  Continue to monitor and adjust meds based on clinical course.      Electronically Signed By: FELIPA Styles   3/8/24 12:51 PM

## 2024-03-08 NOTE — ASSESSMENT & PLAN NOTE
Murmur auscultated.  She at times gets dizzy with standing, vertigo vs orthostatic hypotension vs as.   Discussed safety measures such as slow position changes to decrease risk for falls.

## 2024-03-15 ENCOUNTER — NURSING HOME VISIT (OUTPATIENT)
Dept: POST ACUTE CARE | Facility: EXTERNAL LOCATION | Age: 89
End: 2024-03-15
Payer: MEDICARE

## 2024-03-15 VITALS
BODY MASS INDEX: 21.14 KG/M2 | TEMPERATURE: 97.6 F | RESPIRATION RATE: 20 BRPM | OXYGEN SATURATION: 97 % | WEIGHT: 137 LBS | SYSTOLIC BLOOD PRESSURE: 143 MMHG | DIASTOLIC BLOOD PRESSURE: 82 MMHG | HEART RATE: 72 BPM

## 2024-03-15 DIAGNOSIS — I10 PRIMARY HYPERTENSION: ICD-10-CM

## 2024-03-15 DIAGNOSIS — I95.1 ORTHOSTATIC HYPOTENSION: ICD-10-CM
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
## 2024-03-15 DIAGNOSIS — I35.0 SEVERE AORTIC STENOSIS: ICD-10-CM

## 2024-03-15 PROCEDURE — 99349 HOME/RES VST EST MOD MDM 40: CPT | Performed by: NURSE PRACTITIONER

## 2024-03-15 ASSESSMENT — ENCOUNTER SYMPTOMS
COUGH: 0
VOMITING: 0
DIFFICULTY URINATING: 0
SHORTNESS OF BREATH: 0
NAUSEA: 0
CONSTIPATION: 0
PALPITATIONS: 0
DIARRHEA: 0
ABDOMINAL PAIN: 0

## 2024-03-15 NOTE — ASSESSMENT & PLAN NOTE
As above, at times gets dizzy with postiion changes.  Discussed safety measures.  Also on florinef to help decrease sx.    Liver failure

## 2024-03-15 NOTE — ASSESSMENT & PLAN NOTE
On norvasc and metoprolol, also on fludrocortisone du eto orhtostasis.  BP acceptable at present.  Continue to monitor and adjust meds based on clinical course.

## 2024-03-15 NOTE — LETTER
Patient: Radha Lorenzo  : 1934    Encounter Date: 03/15/2024    Subjective  Radha Lorenzo is a 89 y.o. female  here due to fall.   HPI She had a fall a few days ago, was found to have her pants around her ankles and had just had a bowel movement, appears she may have fallen while pulling up pants.  No apparent injury.   She is slightly forgetful.   There are no family members present during time of visit.    she  denies any complaints of pain  when asked.  Eating and drinking well.   No concerns per staff.       Review of Systems   Respiratory:  Negative for cough and shortness of breath.    Cardiovascular:  Negative for chest pain and palpitations.   Gastrointestinal:  Negative for abdominal pain, constipation, diarrhea, nausea and vomiting.   Genitourinary:  Negative for difficulty urinating.       Objective  /82   Pulse 72   Temp 36.4 °C (97.6 °F)   Resp 20   Wt 62.1 kg (137 lb)   SpO2 97%   BMI 21.14 kg/m²     Physical Exam  Vitals and nursing note reviewed.   Constitutional:       General: She is not in acute distress.  HENT:      Head: Normocephalic and atraumatic.      Ears:      Comments: [place]    Cardiovascular:      Rate and Rhythm: Normal rate and regular rhythm.      Heart sounds: Murmur heard.   Musculoskeletal:      Right lower leg: No edema.      Left lower leg: No edema.   Skin:     General: Skin is warm and dry.   Neurological:      Mental Status: She is alert. Mental status is at baseline.      Comments: forgetful   Psychiatric:         Mood and Affect: Mood normal.         Assessment/Plan  Problem List Items Addressed This Visit       Primary hypertension     On norvasc and metoprolol, also on fludrocortisone du eto orhtostasis.  BP acceptable at present.  Continue to monitor and adjust meds based on clinical course.           Severe aortic stenosis     Murmur auscultated.  She at times gets dizzy with standing, vertigo vs orthostatic hypotension vs as.   Discussed safety  measures such as slow position changes.  She fell with no inury, per staff her pants were around her ankles at time of fall, she was changing pants and this likely contributed to fall         Orthostatic hypotension     As above, at times gets dizzy with postiion changes.  Discussed safety measures.  Also on florinef to help decrease sx.          Fall - Primary     She was found to have her pants around her ankles at time of fall,  no apparent injury.   Safety measures in place.         labs/meds/orders reviewed  staff to monitor and notify for any changes.  Displays poor safety awareness due to cogntive deficits which increases risks for falls.  Safety measures are in place.  Continue to monitor and adjust meds based on clinical course.      Electronically Signed By: FELIPA Styles   3/15/24  1:07 PM

## 2024-03-15 NOTE — ASSESSMENT & PLAN NOTE
Murmur auscultated.  She at times gets dizzy with standing, vertigo vs orthostatic hypotension vs as.   Discussed safety measures such as slow position changes.  She fell with no inury, per staff her pants were around her ankles at time of fall, she was changing pants and this likely contributed to fall

## 2024-03-15 NOTE — PROGRESS NOTES
Subjective   Radha Lorenzo is a 89 y.o. female  here due to fall.   HPI She had a fall a few days ago, was found to have her pants around her ankles and had just had a bowel movement, appears she may have fallen while pulling up pants.  No apparent injury.   She is slightly forgetful.   There are no family members present during time of visit.    she  denies any complaints of pain  when asked.  Eating and drinking well.   No concerns per staff.       Review of Systems   Respiratory:  Negative for cough and shortness of breath.    Cardiovascular:  Negative for chest pain and palpitations.   Gastrointestinal:  Negative for abdominal pain, constipation, diarrhea, nausea and vomiting.   Genitourinary:  Negative for difficulty urinating.       Objective   /82   Pulse 72   Temp 36.4 °C (97.6 °F)   Resp 20   Wt 62.1 kg (137 lb)   SpO2 97%   BMI 21.14 kg/m²     Physical Exam  Vitals and nursing note reviewed.   Constitutional:       General: She is not in acute distress.  HENT:      Head: Normocephalic and atraumatic.      Ears:      Comments: [place]    Cardiovascular:      Rate and Rhythm: Normal rate and regular rhythm.      Heart sounds: Murmur heard.   Musculoskeletal:      Right lower leg: No edema.      Left lower leg: No edema.   Skin:     General: Skin is warm and dry.   Neurological:      Mental Status: She is alert. Mental status is at baseline.      Comments: forgetful   Psychiatric:         Mood and Affect: Mood normal.         Assessment/Plan   Problem List Items Addressed This Visit       Primary hypertension     On norvasc and metoprolol, also on fludrocortisone du eto orhtostasis.  BP acceptable at present.  Continue to monitor and adjust meds based on clinical course.           Severe aortic stenosis     Murmur auscultated.  She at times gets dizzy with standing, vertigo vs orthostatic hypotension vs as.   Discussed safety measures such as slow position changes.  She fell with no inury, per staff  her pants were around her ankles at time of fall, she was changing pants and this likely contributed to fall         Orthostatic hypotension     As above, at times gets dizzy with postiion changes.  Discussed safety measures.  Also on florinef to help decrease sx.          Fall - Primary     She was found to have her pants around her ankles at time of fall,  no apparent injury.   Safety measures in place.         labs/meds/orders reviewed  staff to monitor and notify for any changes.  Displays poor safety awareness due to cogntive deficits which increases risks for falls.  Safety measures are in place.  Continue to monitor and adjust meds based on clinical course.

## 2024-03-15 NOTE — ASSESSMENT & PLAN NOTE
She was found to have her pants around her ankles at time of fall,  no apparent injury.   Safety measures in place.

## 2024-03-22 ENCOUNTER — NURSING HOME VISIT (OUTPATIENT)
Dept: POST ACUTE CARE | Facility: EXTERNAL LOCATION | Age: 89
End: 2024-03-22
Payer: MEDICARE

## 2024-03-22 VITALS
BODY MASS INDEX: 21.14 KG/M2 | OXYGEN SATURATION: 97 % | TEMPERATURE: 97.2 F | RESPIRATION RATE: 18 BRPM | WEIGHT: 137 LBS | SYSTOLIC BLOOD PRESSURE: 135 MMHG | DIASTOLIC BLOOD PRESSURE: 71 MMHG | HEART RATE: 82 BPM

## 2024-03-22 DIAGNOSIS — R41.89 COGNITIVE DECLINE: ICD-10-CM

## 2024-03-22 DIAGNOSIS — I35.0 SEVERE AORTIC STENOSIS: ICD-10-CM

## 2024-03-22 DIAGNOSIS — R42 VERTIGO: ICD-10-CM

## 2024-03-22 DIAGNOSIS — I10 PRIMARY HYPERTENSION: Primary | ICD-10-CM

## 2024-03-22 PROCEDURE — 99349 HOME/RES VST EST MOD MDM 40: CPT | Performed by: NURSE PRACTITIONER

## 2024-03-22 ASSESSMENT — ENCOUNTER SYMPTOMS
COUGH: 0
DIARRHEA: 0
VOMITING: 0
SHORTNESS OF BREATH: 0
ABDOMINAL PAIN: 0
CONSTIPATION: 0
NAUSEA: 0
PALPITATIONS: 0
DIFFICULTY URINATING: 0

## 2024-03-22 NOTE — ASSESSMENT & PLAN NOTE
staff to monitor and notify for any changes.     Colchicine Counseling:  Patient counseled regarding adverse effects including but not limited to stomach upset (nausea, vomiting, stomach pain, or diarrhea).  Patient instructed to limit alcohol consumption while taking this medication.  Colchicine may reduce blood counts especially with prolonged use.  The patient understands that monitoring of kidney function and blood counts may be required, especially at baseline. The patient verbalized understanding of the proper use and possible adverse effects of colchicine.  All of the patient's questions and concerns were addressed.

## 2024-03-22 NOTE — ASSESSMENT & PLAN NOTE
On norvasc and metoprolol, also on fludrocortisone due to orhtostasis.  BP acceptable at present.  Continue to monitor and adjust meds based on clinical course.

## 2024-03-22 NOTE — ASSESSMENT & PLAN NOTE
On meclizine, does not appear to be taking this as needed.  Discussed with Vero that if she is feeling dizzy to alert the nurses.  Also discussed with nursing staff to remind Vero that she has this available as she appears to be forgetting that she has this available.

## 2024-03-22 NOTE — LETTER
Patient: Radha Lorenzo  : 1934    Encounter Date: 2024    Subjective  Radha Lorenzo is a 89 y.o. female  here due to fall.   HPI Here to follow up on fall that occurred a few days ago, she fell out of bed reaching for something, was keyur to get off the floor unassisted.   No apparent injury.  She has chronic dizziness with postion changes  which appears to be a chronic issue and not any acute changes.    There are no family members present during time of visit.    she  denies any complaints when asked.  Eating and drinking well.   No concerns per staff.       Review of Systems   Respiratory:  Negative for cough and shortness of breath.    Cardiovascular:  Negative for chest pain and palpitations.   Gastrointestinal:  Negative for abdominal pain, constipation, diarrhea, nausea and vomiting.   Genitourinary:  Negative for difficulty urinating.       Objective  /71   Pulse 82   Temp 36.2 °C (97.2 °F)   Resp 18   Wt 62.1 kg (137 lb)   SpO2 97%   BMI 21.14 kg/m²     Physical Exam  Vitals and nursing note reviewed.   Constitutional:       General: She is not in acute distress.  HENT:      Head: Normocephalic and atraumatic.      Ears:      Comments: [place]    Cardiovascular:      Rate and Rhythm: Normal rate and regular rhythm.      Heart sounds: Murmur heard.   Musculoskeletal:      Right lower leg: No edema.      Left lower leg: No edema.   Skin:     General: Skin is warm and dry.   Neurological:      Mental Status: She is alert. Mental status is at baseline.      Comments: forgetful   Psychiatric:         Mood and Affect: Mood normal.         Assessment/Plan  Problem List Items Addressed This Visit       Cognitive decline     staff to monitor and notify for any changes.           Primary hypertension - Primary     On norvasc and metoprolol, also on fludrocortisone due to orhtostasis.  BP acceptable at present.  Continue to monitor and adjust meds based on clinical course.           Severe  aortic stenosis     Murmur auscultated.  She at times gets dizzy with standing, vertigo vs orthostatic hypotension vs as.   Discussed safety measures such as slow position changes.          Vertigo     On meclizine, does not appear to be taking this as needed.  Discussed with Vero that if she is feeling dizzy to alert the nurses.  Also discussed with nursing staff to remind Vero that she has this available as she appears to be forgetting that she has this available.         labs/meds/orders reviewed  staff to monitor and notify for any changes.  Displays poor safety awareness due to cogntive deficits which increases risks for falls.  Safety measures are in place.  Continue to monitor and adjust meds based on clinical course.      Electronically Signed By: FELIPA Styles   3/22/24  2:17 PM

## 2024-03-22 NOTE — ASSESSMENT & PLAN NOTE
Murmur auscultated.  She at times gets dizzy with standing, vertigo vs orthostatic hypotension vs as.   Discussed safety measures such as slow position changes.

## 2024-03-22 NOTE — PROGRESS NOTES
Subjective   Radha Lorenzo is a 89 y.o. female  here due to fall.   HPI Here to follow up on fall that occurred a few days ago, she fell out of bed reaching for something, was keyur to get off the floor unassisted.   No apparent injury.  She has chronic dizziness with postion changes  which appears to be a chronic issue and not any acute changes.    There are no family members present during time of visit.    she  denies any complaints when asked.  Eating and drinking well.   No concerns per staff.       Review of Systems   Respiratory:  Negative for cough and shortness of breath.    Cardiovascular:  Negative for chest pain and palpitations.   Gastrointestinal:  Negative for abdominal pain, constipation, diarrhea, nausea and vomiting.   Genitourinary:  Negative for difficulty urinating.       Objective   /71   Pulse 82   Temp 36.2 °C (97.2 °F)   Resp 18   Wt 62.1 kg (137 lb)   SpO2 97%   BMI 21.14 kg/m²     Physical Exam  Vitals and nursing note reviewed.   Constitutional:       General: She is not in acute distress.  HENT:      Head: Normocephalic and atraumatic.      Ears:      Comments: [place]    Cardiovascular:      Rate and Rhythm: Normal rate and regular rhythm.      Heart sounds: Murmur heard.   Musculoskeletal:      Right lower leg: No edema.      Left lower leg: No edema.   Skin:     General: Skin is warm and dry.   Neurological:      Mental Status: She is alert. Mental status is at baseline.      Comments: forgetful   Psychiatric:         Mood and Affect: Mood normal.         Assessment/Plan   Problem List Items Addressed This Visit       Cognitive decline     staff to monitor and notify for any changes.           Primary hypertension - Primary     On norvasc and metoprolol, also on fludrocortisone due to orhtostasis.  BP acceptable at present.  Continue to monitor and adjust meds based on clinical course.           Severe aortic stenosis     Murmur auscultated.  She at times gets dizzy with  standing, vertigo vs orthostatic hypotension vs as.   Discussed safety measures such as slow position changes.          Vertigo     On meclizine, does not appear to be taking this as needed.  Discussed with Vero that if she is feeling dizzy to alert the nurses.  Also discussed with nursing staff to remind Vero that she has this available as she appears to be forgetting that she has this available.         labs/meds/orders reviewed  staff to monitor and notify for any changes.  Displays poor safety awareness due to cogntive deficits which increases risks for falls.  Safety measures are in place.  Continue to monitor and adjust meds based on clinical course.

## 2024-03-27 ENCOUNTER — NURSING HOME VISIT (OUTPATIENT)
Dept: POST ACUTE CARE | Facility: EXTERNAL LOCATION | Age: 89
End: 2024-03-27
Payer: MEDICARE

## 2024-03-27 DIAGNOSIS — I10 PRIMARY HYPERTENSION: ICD-10-CM

## 2024-03-27 DIAGNOSIS — I95.1 ORTHOSTATIC HYPOTENSION: Primary | ICD-10-CM

## 2024-03-27 PROCEDURE — 99348 HOME/RES VST EST LOW MDM 30: CPT | Performed by: INTERNAL MEDICINE

## 2024-03-27 NOTE — LETTER
Patient: Radha Lorenzo  : 1934    Encounter Date: 2024    Follow-up visit  Patient stopped by the nurses station complaining that she is still dizzy at times and wanted to know if we should restart meclizine.  We reviewed her orthostatic blood pressures with her which appear to be not only quite stable with both supine and standing blood pressures indicate hypertension in the 1 50-1 60 range systolic both standing and seated.    Patient states that she does get dizzy when getting out of bed.  There is no sense of dizziness while sitting or standing and rolling over or moving her head.    Medications and orders were reviewed with the patient.    Physical exam current blood pressure is 169/70 standing temp is 97 2 pulse 72 pulse ox is 97% on room air.  She is alert pleasant no apparent distress.  Legs demonstrate less than or equal to 1+ edema.    Assessment and care plan  Dizziness may be secondary to the patient's elevated blood pressure.  She does have a history of orthostatic blood pressure which appears to be much improved and we are concerned that the dizziness may be secondary to the elevated blood pressure.  Given her slight amount of edema and the above elevated blood pressures we will discontinue her Florinef altogether and monitor.  We will continue her orthostatic blood pressures and monitor.  Her symptoms are not highly suggestive of vertigo so we will continue to monitor accordingly      Electronically Signed By: Tej Olvera DO   24  2:21 PM

## 2024-03-27 NOTE — PROGRESS NOTES
Follow-up visit  Patient stopped by the nurses station complaining that she is still dizzy at times and wanted to know if we should restart meclizine.  We reviewed her orthostatic blood pressures with her which appear to be not only quite stable with both supine and standing blood pressures indicate hypertension in the 1 50-1 60 range systolic both standing and seated.    Patient states that she does get dizzy when getting out of bed.  There is no sense of dizziness while sitting or standing and rolling over or moving her head.    Medications and orders were reviewed with the patient.    Physical exam current blood pressure is 169/70 standing temp is 97 2 pulse 72 pulse ox is 97% on room air.  She is alert pleasant no apparent distress.  Legs demonstrate less than or equal to 1+ edema.    Assessment and care plan  Dizziness may be secondary to the patient's elevated blood pressure.  She does have a history of orthostatic blood pressure which appears to be much improved and we are concerned that the dizziness may be secondary to the elevated blood pressure.  Given her slight amount of edema and the above elevated blood pressures we will discontinue her Florinef altogether and monitor.  We will continue her orthostatic blood pressures and monitor.  Her symptoms are not highly suggestive of vertigo so we will continue to monitor accordingly

## 2024-04-02 ENCOUNTER — NURSING HOME VISIT (OUTPATIENT)
Dept: POST ACUTE CARE | Facility: EXTERNAL LOCATION | Age: 89
End: 2024-04-02
Payer: MEDICARE

## 2024-04-02 DIAGNOSIS — I35.0 SEVERE AORTIC STENOSIS: ICD-10-CM

## 2024-04-02 DIAGNOSIS — I10 PRIMARY HYPERTENSION: Primary | ICD-10-CM

## 2024-04-02 DIAGNOSIS — I95.1 ORTHOSTATIC HYPOTENSION: ICD-10-CM
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
## 2024-04-02 PROCEDURE — 99348 HOME/RES VST EST LOW MDM 30: CPT | Performed by: INTERNAL MEDICINE

## 2024-04-02 NOTE — PROGRESS NOTES
Follow-up visit  Patient apparently fell a few days ago and went to the emergency room because of excessive bleeding from her head.  She had an unwitnessed fall and suffered a few small lacerations in her left upper outer periorbital area.  This was treated with Steri-Strips.  Workup including CAT scan was unremarkable and the patient was transferred back to the facility.  Currently she has no specific or new complaints.    Medications and orders were reviewed.    On physical exam  Blood pressure is 134/72 and orthostatic supine standing readings are stable with no significant systolic drop.  Temp is 98 5.  Pulse is 78 pulse ox is 97% on room air she appears to be alert oriented x 3.  HEENT demonstrates a few scattered small lacerations 1 of which is Steri-Stripped along the left upper outer periorbital area.  There is surrounding ecchymotic areas.  Eyes are PERRLA and EOMI.  There is no peripheral edema  Patient does demonstrate some generalized weakness with some difficulty standing independently from a seated position.  She does use a walker.  Her gait is somewhat guarded and shuffled.    Assessment and care plan  Recent fall with contusion and lacerations to the left side of the face.  No other significant injuries.  No evidence of orthostatic hypotension clinically.  She does demonstrate evidence of generalized weakness difficulty standing and shuffled gait placing her at risk for falls and will ask for PT OT to evaluate and treat accordingly.    She does have a history of aortic stenosis reportedly severe.  Again no evidence of orthostatic blood pressure changes at this time.  Her overall heart rate appears to be reasonably controlled and is in regular rhythm.  We will continue to monitor this although possibility of aortic stenosis contributing to her falls cannot be excluded.  Patient's not an ideal candidate for aggressive workup but we will discuss with the family if we have further problems

## 2024-04-02 NOTE — LETTER
Patient: Radha Lorenzo  : 1934    Encounter Date: 2024    Follow-up visit  Patient apparently fell a few days ago and went to the emergency room because of excessive bleeding from her head.  She had an unwitnessed fall and suffered a few small lacerations in her left upper outer periorbital area.  This was treated with Steri-Strips.  Workup including CAT scan was unremarkable and the patient was transferred back to the facility.  Currently she has no specific or new complaints.    Medications and orders were reviewed.    On physical exam  Blood pressure is 134/72 and orthostatic supine standing readings are stable with no significant systolic drop.  Temp is 98 5.  Pulse is 78 pulse ox is 97% on room air she appears to be alert oriented x 3.  HEENT demonstrates a few scattered small lacerations 1 of which is Steri-Stripped along the left upper outer periorbital area.  There is surrounding ecchymotic areas.  Eyes are PERRLA and EOMI.  There is no peripheral edema  Patient does demonstrate some generalized weakness with some difficulty standing independently from a seated position.  She does use a walker.  Her gait is somewhat guarded and shuffled.    Assessment and care plan  Recent fall with contusion and lacerations to the left side of the face.  No other significant injuries.  No evidence of orthostatic hypotension clinically.  She does demonstrate evidence of generalized weakness difficulty standing and shuffled gait placing her at risk for falls and will ask for PT OT to evaluate and treat accordingly.    She does have a history of aortic stenosis reportedly severe.  Again no evidence of orthostatic blood pressure changes at this time.  Her overall heart rate appears to be reasonably controlled and is in regular rhythm.  We will continue to monitor this although possibility of aortic stenosis contributing to her falls cannot be excluded.  Patient's not an ideal candidate for aggressive workup but  we will discuss with the family if we have further problems      Electronically Signed By: Tej Olvera DO   4/2/24  1:22 PM

## 2024-04-04 PROCEDURE — G0180 MD CERTIFICATION HHA PATIENT: HCPCS | Performed by: INTERNAL MEDICINE

## 2024-04-30 ENCOUNTER — NURSING HOME VISIT (OUTPATIENT)
Dept: POST ACUTE CARE | Facility: EXTERNAL LOCATION | Age: 89
End: 2024-04-30
Payer: MEDICARE

## 2024-04-30 VITALS
BODY MASS INDEX: 21.7 KG/M2 | OXYGEN SATURATION: 98 % | WEIGHT: 140.6 LBS | TEMPERATURE: 98.1 F | HEART RATE: 79 BPM | DIASTOLIC BLOOD PRESSURE: 67 MMHG | SYSTOLIC BLOOD PRESSURE: 138 MMHG

## 2024-04-30 DIAGNOSIS — R41.89 COGNITIVE DECLINE: Primary | ICD-10-CM

## 2024-04-30 DIAGNOSIS — I35.0 SEVERE AORTIC STENOSIS: ICD-10-CM

## 2024-04-30 DIAGNOSIS — I10 PRIMARY HYPERTENSION: ICD-10-CM

## 2024-04-30 DIAGNOSIS — R42 VERTIGO: ICD-10-CM

## 2024-04-30 DIAGNOSIS — Z00.00 ROUTINE GENERAL MEDICAL EXAMINATION AT HEALTH CARE FACILITY: ICD-10-CM

## 2024-04-30 PROCEDURE — G0439 PPPS, SUBSEQ VISIT: HCPCS | Performed by: INTERNAL MEDICINE

## 2024-04-30 PROCEDURE — 99348 HOME/RES VST EST LOW MDM 30: CPT | Performed by: INTERNAL MEDICINE

## 2024-04-30 ASSESSMENT — ACTIVITIES OF DAILY LIVING (ADL)
DOING_HOUSEWORK: TOTAL CARE
DRESSING: INDEPENDENT
GROCERY_SHOPPING: TOTAL CARE
BATHING: NEEDS ASSISTANCE
TAKING_MEDICATION: TOTAL CARE
MANAGING_FINANCES: TOTAL CARE

## 2024-04-30 ASSESSMENT — PATIENT HEALTH QUESTIONNAIRE - PHQ9
1. LITTLE INTEREST OR PLEASURE IN DOING THINGS: NOT AT ALL
SUM OF ALL RESPONSES TO PHQ9 QUESTIONS 1 AND 2: 0
2. FEELING DOWN, DEPRESSED OR HOPELESS: NOT AT ALL

## 2024-04-30 ASSESSMENT — ENCOUNTER SYMPTOMS
OCCASIONAL FEELINGS OF UNSTEADINESS: 1
LOSS OF SENSATION IN FEET: 0
DEPRESSION: 0

## 2024-04-30 NOTE — LETTER
Patient: Radha Lorenzo  : 1934    Encounter Date: 2024    Subjective  Reason for Visit: Radha Lorenzo is an 89 y.o. female here for a Medicare Wellness visit.   Patient also seen today because she is complaining once again of dizziness that she says is frequently there during the day.  When asked to describe it she describes it as a sense of lightheadedness and not a sense of off-balance or spinning although sometimes she is vague in her reports of this.    Medications and orders were reviewed            HPI    Patient Care Team:  Cristopher Mccann MD as PCP - General     Review of Systems  No other issues or complaints reported by staff or by patient  Objective  Vitals:  /67   Pulse 79   Temp 36.7 °C (98.1 °F)   Wt 63.8 kg (140 lb 9.6 oz)   SpO2 98%   BMI 21.70 kg/m²       Physical Exam  Vital signs as above.  Although the patient's blood pressure appears to remain stable on occasion there appears to be a significant drop in orthostatic blood pressures but this appears to be rare.  Her blood pressure was checked standing today while she was complaining of dizziness and the blood pressure was 138/70 which is consistent with many of her prior blood pressures even supine and sitting  Lungs are clear.  Heart rate and rhythm is regular with 3/6 to 4/6 systolic ejection murmur.  No peripheral edema.  No focal deficits or tremors noted  Assessment/Plan  Problem List Items Addressed This Visit       Cognitive decline - Primary    Primary hypertension    Severe aortic stenosis    Vertigo     Other Visit Diagnoses       Routine general medical examination at health care facility            Assessment and care plan  The above situation was discussed with the patient's daughter who is POA.  The patient does have a history of aortic stenosis and mild this may be contributing to some of her symptoms her blood pressures generally appear to be quite stable and not consistent with orthostatic  hypotension all the time.  The daughter also indicated that she does not feel like her mother would tolerate an aggressive workup including CAT scans MRIs and other neurological workup in outside basis we agreed to retry meclizine 25 mg 3 times a day even though her symptoms are not particularly vertiginous.  We also will try assessing carotids with a bilateral carotid artery ultrasound to rule out stenosis that may be contributing some of her symptoms.  Ending the above interventions we will monitor the patient's progress and discussed with family if further aggressive workup is warranted         Electronically Signed By: Tej Olvera DO   4/30/24 12:15 PM

## 2024-04-30 NOTE — PROGRESS NOTES
Subjective   Reason for Visit: Radha Lorenzo is an 89 y.o. female here for a Medicare Wellness visit.   Patient also seen today because she is complaining once again of dizziness that she says is frequently there during the day.  When asked to describe it she describes it as a sense of lightheadedness and not a sense of off-balance or spinning although sometimes she is vague in her reports of this.    Medications and orders were reviewed            HPI    Patient Care Team:  Cristopher Mccann MD as PCP - General     Review of Systems  No other issues or complaints reported by staff or by patient  Objective   Vitals:  /67   Pulse 79   Temp 36.7 °C (98.1 °F)   Wt 63.8 kg (140 lb 9.6 oz)   SpO2 98%   BMI 21.70 kg/m²       Physical Exam  Vital signs as above.  Although the patient's blood pressure appears to remain stable on occasion there appears to be a significant drop in orthostatic blood pressures but this appears to be rare.  Her blood pressure was checked standing today while she was complaining of dizziness and the blood pressure was 138/70 which is consistent with many of her prior blood pressures even supine and sitting  Lungs are clear.  Heart rate and rhythm is regular with 3/6 to 4/6 systolic ejection murmur.  No peripheral edema.  No focal deficits or tremors noted  Assessment/Plan   Problem List Items Addressed This Visit       Cognitive decline - Primary    Primary hypertension    Severe aortic stenosis    Vertigo     Other Visit Diagnoses       Routine general medical examination at health care facility            Assessment and care plan  The above situation was discussed with the patient's daughter who is POA.  The patient does have a history of aortic stenosis and mild this may be contributing to some of her symptoms her blood pressures generally appear to be quite stable and not consistent with orthostatic hypotension all the time.  The daughter also indicated that she does not  feel like her mother would tolerate an aggressive workup including CAT scans MRIs and other neurological workup in outside basis we agreed to retry meclizine 25 mg 3 times a day even though her symptoms are not particularly vertiginous.  We also will try assessing carotids with a bilateral carotid artery ultrasound to rule out stenosis that may be contributing some of her symptoms.  Ending the above interventions we will monitor the patient's progress and discussed with family if further aggressive workup is warranted

## 2024-07-22 ENCOUNTER — NURSING HOME VISIT (OUTPATIENT)
Dept: POST ACUTE CARE | Facility: EXTERNAL LOCATION | Age: 89
End: 2024-07-22
Payer: MEDICARE

## 2024-07-22 DIAGNOSIS — R29.6 RECURRENT FALLS: ICD-10-CM

## 2024-07-22 DIAGNOSIS — I35.0 SEVERE AORTIC STENOSIS: ICD-10-CM

## 2024-07-22 DIAGNOSIS — R42 VERTIGO: ICD-10-CM

## 2024-07-22 DIAGNOSIS — I95.1 ORTHOSTATIC HYPOTENSION: ICD-10-CM

## 2024-07-22 DIAGNOSIS — I10 PRIMARY HYPERTENSION: Primary | ICD-10-CM

## 2024-07-22 PROCEDURE — 99349 HOME/RES VST EST MOD MDM 40: CPT | Performed by: INTERNAL MEDICINE

## 2024-07-22 NOTE — LETTER
Patient: Radha Lorenzo  : 1934    Encounter Date: 2024    Follow-up visit  Asked to see patient today.  She has suffered several falls in the last few days.  She has been complaining of profound dizziness even when laying flat.  We came in this morning to evaluate her and she was unable to even get out of bed due to profound weakness and dizziness    Medications and orders were reviewed.    Review of systems  As stated above the patient reports significant dizziness while laying supine in bed worse when she moves her head.  Feels like the room is moving or there is motion.  However she also gets even more dizzy upon trying to sit up or stand.  No chest pain shortness of breath palpitations  No other symptoms reported.  Staff reports that the patient has fallen at least 3 times in the past few days all unwitnessed.  She was sent to the emergency room 1 time but was sent back    Physical exam  Blood pressure is 149/70 laying flat.  Upon sitting up and trying to stand she was quite weak.  We rechecked her blood pressure while sitting and it was 105/60.  HEENT demonstrates multiple contusions and ecchymotic areas about the face.  Eyes are PERRL and EOMI conjunctiva are clear  Lungs are clear.  Heart rate and rhythm is regular with a 3/6 to 4/6 systolic murmur.  Abdomen is soft nontender  There is no peripheral edema    Assessment and care plan  Recurrent falls due to profound weakness and dizziness.  This is complicated by the fact that the dizziness has multiple components that are suggestive of serious vertigo on the one hand but also has evidence of orthostatic hypotension in the context of having severe aortic stenosis.  Since the patient has recurrent falls and is unable to stand we are unable to manage her here at the facility and feel that she has to have a thorough neurologic and cardiac workup.  We have discussed the above assessment and care plan with the patient's daughter and POA.  We are  sending the patient out by EMS      Electronically Signed By: Tej Olvera DO   7/22/24 10:06 AM

## 2024-07-22 NOTE — PROGRESS NOTES
Follow-up visit  Asked to see patient today.  She has suffered several falls in the last few days.  She has been complaining of profound dizziness even when laying flat.  We came in this morning to evaluate her and she was unable to even get out of bed due to profound weakness and dizziness    Medications and orders were reviewed.    Review of systems  As stated above the patient reports significant dizziness while laying supine in bed worse when she moves her head.  Feels like the room is moving or there is motion.  However she also gets even more dizzy upon trying to sit up or stand.  No chest pain shortness of breath palpitations  No other symptoms reported.  Staff reports that the patient has fallen at least 3 times in the past few days all unwitnessed.  She was sent to the emergency room 1 time but was sent back    Physical exam  Blood pressure is 149/70 laying flat.  Upon sitting up and trying to stand she was quite weak.  We rechecked her blood pressure while sitting and it was 105/60.  HEENT demonstrates multiple contusions and ecchymotic areas about the face.  Eyes are PERRL and EOMI conjunctiva are clear  Lungs are clear.  Heart rate and rhythm is regular with a 3/6 to 4/6 systolic murmur.  Abdomen is soft nontender  There is no peripheral edema    Assessment and care plan  Recurrent falls due to profound weakness and dizziness.  This is complicated by the fact that the dizziness has multiple components that are suggestive of serious vertigo on the one hand but also has evidence of orthostatic hypotension in the context of having severe aortic stenosis.  Since the patient has recurrent falls and is unable to stand we are unable to manage her here at the facility and feel that she has to have a thorough neurologic and cardiac workup.  We have discussed the above assessment and care plan with the patient's daughter and POA.  We are sending the patient out by EMS
